# Patient Record
Sex: MALE | NOT HISPANIC OR LATINO | URBAN - NONMETROPOLITAN AREA
[De-identification: names, ages, dates, MRNs, and addresses within clinical notes are randomized per-mention and may not be internally consistent; named-entity substitution may affect disease eponyms.]

---

## 2017-06-06 ENCOUNTER — TELEPHONE (OUTPATIENT)
Dept: ENDOCRINOLOGY | Facility: CLINIC | Age: 52
End: 2017-06-06

## 2023-05-30 ENCOUNTER — HOSPITAL ENCOUNTER (EMERGENCY)
Facility: HOSPITAL | Age: 58
Discharge: HOME | End: 2023-05-30
Attending: EMERGENCY MEDICINE | Admitting: EMERGENCY MEDICINE
Payer: MEDICARE

## 2023-05-30 ENCOUNTER — APPOINTMENT (OUTPATIENT)
Dept: GENERAL RADIOLOGY | Facility: HOSPITAL | Age: 58
End: 2023-05-30
Payer: MEDICARE

## 2023-05-30 VITALS
OXYGEN SATURATION: 98 % | HEIGHT: 71 IN | TEMPERATURE: 97.7 F | WEIGHT: 138 LBS | SYSTOLIC BLOOD PRESSURE: 123 MMHG | DIASTOLIC BLOOD PRESSURE: 71 MMHG | RESPIRATION RATE: 18 BRPM | HEART RATE: 88 BPM | BODY MASS INDEX: 19.32 KG/M2

## 2023-05-30 DIAGNOSIS — R19.7 DIARRHEA, UNSPECIFIED TYPE: ICD-10-CM

## 2023-05-30 DIAGNOSIS — R07.9 CHEST PAIN, UNSPECIFIED TYPE: Primary | ICD-10-CM

## 2023-05-30 DIAGNOSIS — I31.9 PERICARDITIS, UNSPECIFIED CHRONICITY, UNSPECIFIED TYPE: ICD-10-CM

## 2023-05-30 LAB
ALBUMIN SERPL-MCNC: 4.3 G/DL (ref 3.5–5.2)
ALBUMIN/GLOB SERPL: 1.6 G/DL
ALP SERPL-CCNC: 93 U/L (ref 39–117)
ALT SERPL W P-5'-P-CCNC: 14 U/L (ref 1–41)
ANION GAP SERPL CALCULATED.3IONS-SCNC: 10 MMOL/L (ref 5–15)
AST SERPL-CCNC: 13 U/L (ref 1–40)
BASOPHILS # BLD AUTO: 0.06 10*3/MM3 (ref 0–0.2)
BASOPHILS NFR BLD AUTO: 1.2 % (ref 0–1.5)
BILIRUB SERPL-MCNC: 0.7 MG/DL (ref 0–1.2)
BUN SERPL-MCNC: 15 MG/DL (ref 6–20)
BUN/CREAT SERPL: 16.5 (ref 7–25)
CALCIUM SPEC-SCNC: 10.7 MG/DL (ref 8.6–10.5)
CHLORIDE SERPL-SCNC: 92 MMOL/L (ref 98–107)
CO2 SERPL-SCNC: 28 MMOL/L (ref 22–29)
CREAT SERPL-MCNC: 0.91 MG/DL (ref 0.76–1.27)
DEPRECATED RDW RBC AUTO: 37.8 FL (ref 37–54)
EGFRCR SERPLBLD CKD-EPI 2021: 98.9 ML/MIN/1.73
EOSINOPHIL # BLD AUTO: 0.06 10*3/MM3 (ref 0–0.4)
EOSINOPHIL NFR BLD AUTO: 1.2 % (ref 0.3–6.2)
ERYTHROCYTE [DISTWIDTH] IN BLOOD BY AUTOMATED COUNT: 11.5 % (ref 12.3–15.4)
GEN 5 2HR TROPONIN T REFLEX: 7 NG/L
GLOBULIN UR ELPH-MCNC: 2.7 GM/DL
GLUCOSE SERPL-MCNC: 205 MG/DL (ref 65–99)
HCT VFR BLD AUTO: 42.7 % (ref 37.5–51)
HGB BLD-MCNC: 14.5 G/DL (ref 13–17.7)
HOLD SPECIMEN: NORMAL
IMM GRANULOCYTES # BLD AUTO: 0.01 10*3/MM3 (ref 0–0.05)
IMM GRANULOCYTES NFR BLD AUTO: 0.2 % (ref 0–0.5)
LIPASE SERPL-CCNC: 21 U/L (ref 13–60)
LYMPHOCYTES # BLD AUTO: 1.8 10*3/MM3 (ref 0.7–3.1)
LYMPHOCYTES NFR BLD AUTO: 35.8 % (ref 19.6–45.3)
MCH RBC QN AUTO: 30.5 PG (ref 26.6–33)
MCHC RBC AUTO-ENTMCNC: 34 G/DL (ref 31.5–35.7)
MCV RBC AUTO: 89.7 FL (ref 79–97)
MONOCYTES # BLD AUTO: 0.31 10*3/MM3 (ref 0.1–0.9)
MONOCYTES NFR BLD AUTO: 6.2 % (ref 5–12)
NEUTROPHILS NFR BLD AUTO: 2.79 10*3/MM3 (ref 1.7–7)
NEUTROPHILS NFR BLD AUTO: 55.4 % (ref 42.7–76)
NRBC BLD AUTO-RTO: 0 /100 WBC (ref 0–0.2)
NT-PROBNP SERPL-MCNC: <36 PG/ML (ref 0–900)
PLATELET # BLD AUTO: 307 10*3/MM3 (ref 140–450)
PMV BLD AUTO: 9.8 FL (ref 6–12)
POTASSIUM SERPL-SCNC: 5 MMOL/L (ref 3.5–5.2)
PROT SERPL-MCNC: 7 G/DL (ref 6–8.5)
QT INTERVAL: 322 MS
QTC INTERVAL: 415 MS
RBC # BLD AUTO: 4.76 10*6/MM3 (ref 4.14–5.8)
SODIUM SERPL-SCNC: 130 MMOL/L (ref 136–145)
TROPONIN T DELTA: -1 NG/L
TROPONIN T SERPL HS-MCNC: 8 NG/L
WBC NRBC COR # BLD: 5.03 10*3/MM3 (ref 3.4–10.8)
WHOLE BLOOD HOLD COAG: NORMAL

## 2023-05-30 PROCEDURE — 99283 EMERGENCY DEPT VISIT LOW MDM: CPT

## 2023-05-30 PROCEDURE — 99284 EMERGENCY DEPT VISIT MOD MDM: CPT

## 2023-05-30 PROCEDURE — 83880 ASSAY OF NATRIURETIC PEPTIDE: CPT | Performed by: EMERGENCY MEDICINE

## 2023-05-30 PROCEDURE — 83690 ASSAY OF LIPASE: CPT

## 2023-05-30 PROCEDURE — 71045 X-RAY EXAM CHEST 1 VIEW: CPT

## 2023-05-30 PROCEDURE — 36415 COLL VENOUS BLD VENIPUNCTURE: CPT

## 2023-05-30 PROCEDURE — 85025 COMPLETE CBC W/AUTO DIFF WBC: CPT | Performed by: EMERGENCY MEDICINE

## 2023-05-30 PROCEDURE — 80053 COMPREHEN METABOLIC PANEL: CPT | Performed by: EMERGENCY MEDICINE

## 2023-05-30 PROCEDURE — 93005 ELECTROCARDIOGRAM TRACING: CPT | Performed by: EMERGENCY MEDICINE

## 2023-05-30 PROCEDURE — 84484 ASSAY OF TROPONIN QUANT: CPT | Performed by: EMERGENCY MEDICINE

## 2023-05-30 RX ORDER — NAPROXEN 500 MG/1
500 TABLET ORAL 2 TIMES DAILY WITH MEALS
Qty: 14 TABLET | Refills: 0 | Status: SHIPPED | OUTPATIENT
Start: 2023-05-30 | End: 2023-06-05

## 2023-05-30 RX ORDER — SODIUM CHLORIDE 0.9 % (FLUSH) 0.9 %
10 SYRINGE (ML) INJECTION AS NEEDED
Status: DISCONTINUED | OUTPATIENT
Start: 2023-05-30 | End: 2023-05-30 | Stop reason: HOSPADM

## 2023-05-30 NOTE — ED NOTES
"Per pt family \"we were at [employer] over the weekend and he had a slight heart attack and sent him home.\" Wants second opinion as pt is still having CP.   "

## 2023-05-30 NOTE — Clinical Note
[facility] EMERGENCY DEPARTMENT  [address] [place] [id number]  Phone: [phone]    Chandu Torres was seen and treated in our emergency department on 5/30/2023.  He may return to work on 06/01/2023.         Thank you for choosing [facility].    Vishal Anderson, DO

## 2023-05-30 NOTE — ED PROVIDER NOTES
Subjective   History of Present Illness  Pt presents to the ED with chest tightness. He reports it started last night but this morning it got worse. He was waking up from his sleep when he started to feel the tightness become greater. Non radiating. Leaning forward makes it worse. He's also been having some abdominal pain and diarrhea that started last week and his last episode of vomiting was 3 days ago. Non smoker. PMHx of DM.         Review of Systems   Constitutional: Negative for appetite change, fatigue and fever.   HENT: Negative for ear pain, sneezing, sore throat and trouble swallowing.    Respiratory: Positive for chest tightness. Negative for cough and shortness of breath.    Cardiovascular: Negative for chest pain.   Gastrointestinal: Positive for abdominal pain, diarrhea and vomiting. Negative for constipation and nausea.   Genitourinary: Negative for difficulty urinating.   Musculoskeletal: Negative for back pain and myalgias.   Neurological: Negative for headaches.       History reviewed. No pertinent past medical history.    No Known Allergies    History reviewed. No pertinent surgical history.    History reviewed. No pertinent family history.    Social History     Socioeconomic History   • Marital status:            Objective   Physical Exam  Vitals reviewed.   Constitutional:       Appearance: Normal appearance.   HENT:      Head: Normocephalic and atraumatic.   Eyes:      General: Lids are normal.      Extraocular Movements: Extraocular movements intact.      Conjunctiva/sclera: Conjunctivae normal.   Cardiovascular:      Rate and Rhythm: Normal rate and regular rhythm.      Pulses: Normal pulses.      Heart sounds: Normal heart sounds.   Pulmonary:      Effort: Pulmonary effort is normal.      Breath sounds: Normal breath sounds.   Abdominal:      General: Bowel sounds are normal.      Palpations: Abdomen is soft.   Musculoskeletal:      Right lower leg: No edema.      Left lower leg: No  edema.   Skin:     General: Skin is warm and dry.   Neurological:      General: No focal deficit present.      Mental Status: He is alert and oriented to person, place, and time.      Gait: Gait is intact.   Psychiatric:         Attention and Perception: Attention normal.         Mood and Affect: Mood normal.         Behavior: Behavior normal. Behavior is cooperative.         Thought Content: Thought content normal.         Cognition and Memory: Cognition normal.         Judgment: Judgment normal.         ECG 12 Lead ED Triage Standing Order; Chest Pain      Date/Time: 5/30/2023 10:51 AM  Performed by: Esperanza Cardenas MD  Authorized by: Vishal Andersno DO                    ED Course  ED Course as of 06/02/23 2331   Tue May 30, 2023   1258 Calcium(!): 10.7 [JV]      ED Course User Index  [JV] Vishal Anderson DO                                           Medical Decision Making  58 y.o M presents to the ED with chest tightness. Labs and imaging reviewed. Heart score 3.      Based on the positional nature of his pain, his EKG, and his report of recent diarrhea and viral illness I suspect pericarditis.    Chest pain, unspecified type: acute illness or injury  Diarrhea, unspecified type: acute illness or injury  Pericarditis, unspecified chronicity, unspecified type: acute illness or injury  Amount and/or Complexity of Data Reviewed  Labs: ordered. Decision-making details documented in ED Course.  Radiology: ordered.  ECG/medicine tests: ordered and independent interpretation performed.      Risk  OTC drugs.  Prescription drug management.          Final diagnoses:   Chest pain, unspecified type   Pericarditis, unspecified chronicity, unspecified type   Diarrhea, unspecified type       ED Disposition  ED Disposition     ED Disposition   Discharge    Condition   Stable    Comment   --             [facility] EMERGENCY DEPARTMENT  [address] [place] [address]  [id number] [phone]    As needed, If symptoms worsen at any time    Nahid Carias MD  [address] [place] [address] [address] [place] [address] [phone]    Call today  To make an appointment for reevaluation of chest pain after an emergency department visit and be further evaluated for your coronary arteries and the possibility of pericarditis         Medication List      New Prescriptions    naproxen 500 MG EC tablet  Commonly known as: EC NAPROSYN  Take 1 tablet by mouth 2 (Two) Times a Day With Meals.           Where to Get Your Medications      These medications were sent to [id number] - [place], [place] - [address] AT [address] - [phone]  - [phone]   [address] [place] [address]    Phone: [phone]   · naproxen 500 MG EC tablet          Vishal Anderson DO  06/02/23 9762

## 2023-06-05 ENCOUNTER — LAB (OUTPATIENT)
Dept: LAB | Facility: HOSPITAL | Age: 58
End: 2023-06-05
Payer: MEDICARE

## 2023-06-05 ENCOUNTER — OFFICE VISIT (OUTPATIENT)
Dept: FAMILY MEDICINE CLINIC | Facility: CLINIC | Age: 58
End: 2023-06-05
Payer: MEDICARE

## 2023-06-05 VITALS
OXYGEN SATURATION: 99 % | DIASTOLIC BLOOD PRESSURE: 90 MMHG | HEART RATE: 115 BPM | BODY MASS INDEX: 17.25 KG/M2 | SYSTOLIC BLOOD PRESSURE: 132 MMHG | HEIGHT: 71 IN | WEIGHT: 123.2 LBS | TEMPERATURE: 98.4 F

## 2023-06-05 DIAGNOSIS — E11.43 TYPE 2 DIABETES MELLITUS WITH DIABETIC AUTONOMIC NEUROPATHY, WITHOUT LONG-TERM CURRENT USE OF INSULIN: ICD-10-CM

## 2023-06-05 DIAGNOSIS — E11.9 TYPE 2 DIABETES MELLITUS WITHOUT COMPLICATION, WITHOUT LONG-TERM CURRENT USE OF INSULIN: ICD-10-CM

## 2023-06-05 DIAGNOSIS — Z00.00 ENCOUNTER FOR MEDICAL EXAMINATION TO ESTABLISH CARE: ICD-10-CM

## 2023-06-05 DIAGNOSIS — Z51.81 THERAPEUTIC DRUG MONITORING: ICD-10-CM

## 2023-06-05 DIAGNOSIS — E11.9 TYPE 2 DIABETES MELLITUS WITHOUT COMPLICATION, WITHOUT LONG-TERM CURRENT USE OF INSULIN: Primary | ICD-10-CM

## 2023-06-05 LAB
AMPHET+METHAMPHET UR QL: NEGATIVE
AMPHETAMINES UR QL: NEGATIVE
BARBITURATES UR QL SCN: NEGATIVE
BENZODIAZ UR QL SCN: NEGATIVE
BUPRENORPHINE SERPL-MCNC: NEGATIVE NG/ML
CANNABINOIDS SERPL QL: NEGATIVE
COCAINE UR QL: POSITIVE
FENTANYL UR-MCNC: NEGATIVE NG/ML
METHADONE UR QL SCN: NEGATIVE
OPIATES UR QL: NEGATIVE
OXYCODONE UR QL SCN: NEGATIVE
PCP UR QL SCN: NEGATIVE
PROPOXYPH UR QL: NEGATIVE
TRICYCLICS UR QL SCN: NEGATIVE

## 2023-06-05 PROCEDURE — 80061 LIPID PANEL: CPT

## 2023-06-05 PROCEDURE — 36415 COLL VENOUS BLD VENIPUNCTURE: CPT

## 2023-06-05 PROCEDURE — 80307 DRUG TEST PRSMV CHEM ANLYZR: CPT

## 2023-06-05 PROCEDURE — 83036 HEMOGLOBIN GLYCOSYLATED A1C: CPT

## 2023-06-05 RX ORDER — GABAPENTIN 300 MG/1
300 CAPSULE ORAL NIGHTLY
Qty: 30 CAPSULE | Refills: 0 | Status: SHIPPED | OUTPATIENT
Start: 2023-06-05

## 2023-06-05 RX ORDER — DULAGLUTIDE 1.5 MG/.5ML
INJECTION, SOLUTION SUBCUTANEOUS
COMMUNITY
Start: 2023-05-08

## 2023-06-05 NOTE — PROGRESS NOTES
Establish care   HTN  DM  Neuropathy takes lisinopril 5mg  Had potatoe chips before he came ere   On metformin 1000 BID+  Trulicity once a week   Naproxen 500mg     Neuropathy started 7 years   Blt                                                                                            Affects day to day life  Gabapentin stopped nov 2021    Check BP at home 117/89  BS at home  169        [facility]  Esperanza Cardenas MD    Subjective:     Chandu Torres is a 58 y.o. male who presents to the clinic to establish care. Pt has a hx of HTN and DM and does not feel as if it is being managing well. He also has BLT neuropathy in his feet. He does check his BP at home and it usually runs 117/89 and his blood sugars run around 169.     The following portions of the patient's history were reviewed and updated as appropriate: allergies, current medications, past family history, past medical history, past social history, past surgical history and problem list.    Past Medical Hx:  History reviewed. No pertinent past medical history.    Past Surgical Hx:  History reviewed. No pertinent surgical history.    Current Meds:    Current Outpatient Medications:   •  Trulicity 1.5 MG/0.5ML solution pen-injector, ADMINISTER 1.5 MG UNDER THE SKIN WEEKLY, Disp: , Rfl:   •  Dulaglutide (Trulicity) 3 MG/0.5ML solution pen-injector, Inject 0.5 mL under the skin into the appropriate area as directed 1 (One) Time Per Week., Disp: 0.5 mL, Rfl: 0  •  empagliflozin (Jardiance) 10 MG tablet tablet, Take 1 tablet by mouth Daily., Disp: 30 tablet, Rfl: 0  •  gabapentin (NEURONTIN) 300 MG capsule, Take 1 capsule by mouth Every Night., Disp: 30 capsule, Rfl: 0  •  metFORMIN (FORTAMET) 1000 MG (OSM) 24 hr tablet, Take 1 tablet by mouth 2 (Two) Times a Day With Meals., Disp: 60 tablet, Rfl: 0    Allergies:  No Known Allergies    Family Hx:  History reviewed. No pertinent family history.     Social History:  Social History     Socioeconomic  "History   • Marital status:        Review of Systems  Review of Systems   Constitutional: Negative for appetite change, fatigue and fever.   HENT: Negative for ear pain, sneezing, sore throat and trouble swallowing.    Respiratory: Negative for cough, chest tightness and shortness of breath.    Cardiovascular: Negative for chest pain.   Gastrointestinal: Negative for abdominal pain, constipation, diarrhea, nausea and vomiting.   Genitourinary: Negative for difficulty urinating.   Musculoskeletal: Negative for back pain and myalgias.   Neurological: Negative for headaches.       Objective:     /90   Pulse 115   Temp 98.4 °F (36.9 °C)   Ht 180.3 cm (71\")   Wt 55.9 kg (123 lb 3.2 oz)   SpO2 99%   BMI 17.18 kg/m²   Physical Exam  Vitals reviewed.   Constitutional:       Appearance: Normal appearance.   HENT:      Head: Normocephalic and atraumatic.   Eyes:      General: Lids are normal.      Extraocular Movements: Extraocular movements intact.      Conjunctiva/sclera: Conjunctivae normal.   Cardiovascular:      Rate and Rhythm: Normal rate and regular rhythm.      Pulses: Normal pulses.      Heart sounds: Normal heart sounds.   Pulmonary:      Effort: Pulmonary effort is normal.      Breath sounds: Normal breath sounds.   Abdominal:      General: Bowel sounds are normal.      Palpations: Abdomen is soft.   Musculoskeletal:      Right lower leg: No edema.      Left lower leg: No edema.   Skin:     General: Skin is warm and dry.   Neurological:      General: No focal deficit present.      Mental Status: He is alert and oriented to person, place, and time.      Gait: Gait is intact.   Psychiatric:         Attention and Perception: Attention normal.         Mood and Affect: Mood normal.         Behavior: Behavior normal. Behavior is cooperative.         Thought Content: Thought content normal.         Cognition and Memory: Cognition normal.         Judgment: Judgment normal.          Assessment/Plan: "     Diagnoses and all orders for this visit:    1. Type 2 diabetes mellitus without complication, without long-term current use of insulin (Primary)  -     Hemoglobin A1c; Future  -     Lipid panel; Future    2. Encounter for medical examination to establish care  -     Lipid panel; Future    3. Therapeutic drug monitoring  -     Urine Drug Screen - Urine, Clean Catch; Future    4. Type 2 diabetes mellitus with diabetic autonomic neuropathy, without long-term current use of insulin  -     gabapentin (NEURONTIN) 300 MG capsule; Take 1 capsule by mouth Every Night.  Dispense: 30 capsule; Refill: 0      T2DM- check A1c and lipid panel. Make adjustments to medication as needed. Pt is currently on Trulicity. Will titrate up to 3.0. titrate as needed.    Neuropathy- Pt was taking gabapentin. Stopped taking it two years ago. Will like to restart it. Pt signed pain contract and understand the risk and benefits of this medication. UDS pending.     · Rx changes: see a/p  · Patient Education: see a/p  · Compliance at present is estimated to be good.   · Efforts to improve compliance (if necessary) will be directed at increased exercise.    Depression screening: Up to date; last screen 6/5/2023     Follow-up:     Return in about 4 weeks (around 7/3/2023).    Preventative:  Health Maintenance   Topic Date Due   • Hepatitis B (1 of 3 - 3-dose series) Never done   • URINE MICROALBUMIN  Never done   • COLORECTAL CANCER SCREENING  Never done   • COVID-19 Vaccine (1) Never done   • Pneumococcal Vaccine 0-64 (1 - PCV) Never done   • ZOSTER VACCINE (1 of 2) Never done   • HEPATITIS C SCREENING  Never done   • ANNUAL WELLNESS VISIT  Never done   • DIABETIC FOOT EXAM  Never done   • DIABETIC EYE EXAM  Never done   • INFLUENZA VACCINE  08/01/2023   • HEMOGLOBIN A1C  12/05/2023   • TDAP/TD VACCINES (2 - Td or Tdap) 12/31/2023       Alcohol use:  has no history on file for alcohol use.  Nicotine status  has no history on file for tobacco  use.     Goals    None         RISK SCORE: 3            This document has been electronically signed by Esperanza Cardenas MD on June 9, 2023 14:24 CDT                                                                          A1C  LipidUDS

## 2023-06-06 LAB
CHOLEST SERPL-MCNC: 178 MG/DL (ref 0–200)
HBA1C MFR BLD: 12.5 % (ref 4.8–5.6)
HDLC SERPL-MCNC: 93 MG/DL (ref 40–60)
LDLC SERPL CALC-MCNC: 65 MG/DL (ref 0–100)
LDLC/HDLC SERPL: 0.66 {RATIO}
TRIGL SERPL-MCNC: 118 MG/DL (ref 0–150)
VLDLC SERPL-MCNC: 20 MG/DL (ref 5–40)

## 2023-06-07 ENCOUNTER — TELEPHONE (OUTPATIENT)
Dept: FAMILY MEDICINE CLINIC | Facility: CLINIC | Age: 58
End: 2023-06-07
Payer: MEDICARE

## 2023-06-07 DIAGNOSIS — E11.9 TYPE 2 DIABETES MELLITUS WITHOUT COMPLICATION, WITHOUT LONG-TERM CURRENT USE OF INSULIN: Primary | ICD-10-CM

## 2023-06-07 RX ORDER — METFORMIN HYDROCHLORIDE EXTENDED-RELEASE TABLETS 1000 MG/1
1000 TABLET, FILM COATED, EXTENDED RELEASE ORAL 2 TIMES DAILY WITH MEALS
Qty: 60 TABLET | Refills: 0 | Status: SHIPPED | OUTPATIENT
Start: 2023-06-07

## 2023-06-07 RX ORDER — DULAGLUTIDE 3 MG/.5ML
3 INJECTION, SOLUTION SUBCUTANEOUS WEEKLY
Qty: 0.5 ML | Refills: 0 | Status: SHIPPED | OUTPATIENT
Start: 2023-06-07

## 2023-06-07 NOTE — TELEPHONE ENCOUNTER
Spoke to patient about UDS pos for cocaine. He has signed a pain contract during his initial visit to get treated for neuropathy. He has been prescribed gabapentin. Pt agrees verbally agrees this will not happen again.     For his DM- maxed out his metformin to 1000 BID, started jardiance 10mg, and increased trulicity to 3.0 mg weekly.             This document has been electronically signed by Esperanza Cardenas MD on June 7, 2023 16:14 CDT

## 2023-06-09 NOTE — PROGRESS NOTES
Chandu Torres  6/5/23  Subjective:     Chandu Torres is a 58 y.o. male who presents for   Chief Complaint   Patient presents with   • Establish Care       58-year-old male with history of type 2 diabetes hypertension chronic peripheral neuropathy recently seen at the emergency room with chest pain here today to establish.  Patient has neuropathic pain in both lower extremities and attributes this to his diabetes he relates his sugars have been in the mid to upper 100s and blood pressures at home range in the 120-140/80-90 range.  Patient was recently seen with chest pain but currently denies chest pain he continues on lisinopril 5 mg daily metformin 1000 twice daily and Trulicity.  He notes neuropathic symptoms ongoing for 7 to 8 years and had previously been on gabapentin and is requesting a refill on this.  Please see Dr. Cardenas's note for more detailed information regarding today's encounter physical exam findings and plan of care.        Past Medical Hx:  History reviewed. No pertinent past medical history.    Past Surgical Hx:  History reviewed. No pertinent surgical history.    Health Maintenance:  Health Maintenance   Topic Date Due   • Hepatitis B (1 of 3 - 3-dose series) Never done   • URINE MICROALBUMIN  Never done   • COLORECTAL CANCER SCREENING  Never done   • COVID-19 Vaccine (1) Never done   • Pneumococcal Vaccine 0-64 (1 - PCV) Never done   • ZOSTER VACCINE (1 of 2) Never done   • HEPATITIS C SCREENING  Never done   • ANNUAL WELLNESS VISIT  Never done   • DIABETIC FOOT EXAM  Never done   • DIABETIC EYE EXAM  Never done   • INFLUENZA VACCINE  08/01/2023   • HEMOGLOBIN A1C  12/05/2023   • TDAP/TD VACCINES (2 - Td or Tdap) 12/31/2023       Current Meds:    Current Outpatient Medications:   •  Trulicity 1.5 MG/0.5ML solution pen-injector, ADMINISTER 1.5 MG UNDER THE SKIN WEEKLY, Disp: , Rfl:   •  Dulaglutide (Trulicity) 3 MG/0.5ML solution pen-injector, Inject 0.5 mL under the skin into the  "appropriate area as directed 1 (One) Time Per Week., Disp: 0.5 mL, Rfl: 0  •  empagliflozin (Jardiance) 10 MG tablet tablet, Take 1 tablet by mouth Daily., Disp: 30 tablet, Rfl: 0  •  gabapentin (NEURONTIN) 300 MG capsule, Take 1 capsule by mouth Every Night., Disp: 30 capsule, Rfl: 0  •  metFORMIN (FORTAMET) 1000 MG (OSM) 24 hr tablet, Take 1 tablet by mouth 2 (Two) Times a Day With Meals., Disp: 60 tablet, Rfl: 0    Allergies:  Patient has no known allergies.    Family Hx:  History reviewed. No pertinent family history.     Social History:  Social History     Socioeconomic History   • Marital status:        Review of Systems  Review of Systems    Objective:     /90   Pulse 115   Temp 98.4 °F (36.9 °C)   Ht 180.3 cm (71\")   Wt 55.9 kg (123 lb 3.2 oz)   SpO2 99%   BMI 17.18 kg/m²   Physical Exam alert no distress please see Dr. Cardenas's note for more detailed information regarding physical exam findings    Lab Review  Results for orders placed or performed during the hospital encounter of 05/30/23   High Sensitivity Troponin T    Specimen: Blood   Result Value Ref Range    HS Troponin T 8 <15 ng/L   Comprehensive Metabolic Panel    Specimen: Blood   Result Value Ref Range    Glucose 205 (H) 65 - 99 mg/dL    BUN 15 6 - 20 mg/dL    Creatinine 0.91 0.76 - 1.27 mg/dL    Sodium 130 (L) 136 - 145 mmol/L    Potassium 5.0 3.5 - 5.2 mmol/L    Chloride 92 (L) 98 - 107 mmol/L    CO2 28.0 22.0 - 29.0 mmol/L    Calcium 10.7 (H) 8.6 - 10.5 mg/dL    Total Protein 7.0 6.0 - 8.5 g/dL    Albumin 4.3 3.5 - 5.2 g/dL    ALT (SGPT) 14 1 - 41 U/L    AST (SGOT) 13 1 - 40 U/L    Alkaline Phosphatase 93 39 - 117 U/L    Total Bilirubin 0.7 0.0 - 1.2 mg/dL    Globulin 2.7 gm/dL    A/G Ratio 1.6 g/dL    BUN/Creatinine Ratio 16.5 7.0 - 25.0    Anion Gap 10.0 5.0 - 15.0 mmol/L    eGFR 98.9 >60.0 mL/min/1.73   BNP    Specimen: Blood   Result Value Ref Range    proBNP <36.0 0.0 - 900.0 pg/mL   CBC Auto Differential    Specimen: " Blood   Result Value Ref Range    WBC 5.03 3.40 - 10.80 10*3/mm3    RBC 4.76 4.14 - 5.80 10*6/mm3    Hemoglobin 14.5 13.0 - 17.7 g/dL    Hematocrit 42.7 37.5 - 51.0 %    MCV 89.7 79.0 - 97.0 fL    MCH 30.5 26.6 - 33.0 pg    MCHC 34.0 31.5 - 35.7 g/dL    RDW 11.5 (L) 12.3 - 15.4 %    RDW-SD 37.8 37.0 - 54.0 fl    MPV 9.8 6.0 - 12.0 fL    Platelets 307 140 - 450 10*3/mm3    Neutrophil % 55.4 42.7 - 76.0 %    Lymphocyte % 35.8 19.6 - 45.3 %    Monocyte % 6.2 5.0 - 12.0 %    Eosinophil % 1.2 0.3 - 6.2 %    Basophil % 1.2 0.0 - 1.5 %    Immature Grans % 0.2 0.0 - 0.5 %    Neutrophils, Absolute 2.79 1.70 - 7.00 10*3/mm3    Lymphocytes, Absolute 1.80 0.70 - 3.10 10*3/mm3    Monocytes, Absolute 0.31 0.10 - 0.90 10*3/mm3    Eosinophils, Absolute 0.06 0.00 - 0.40 10*3/mm3    Basophils, Absolute 0.06 0.00 - 0.20 10*3/mm3    Immature Grans, Absolute 0.01 0.00 - 0.05 10*3/mm3    nRBC 0.0 0.0 - 0.2 /100 WBC   Lipase    Specimen: Blood   Result Value Ref Range    Lipase 21 13 - 60 U/L   High Sensitivity Troponin T 2Hr    Specimen: Blood   Result Value Ref Range    HS Troponin T 7 <15 ng/L    Troponin T Delta -1 >=-4 - <+4 ng/L   ECG 12 Lead ED Triage Standing Order; Chest Pain   Result Value Ref Range    QT Interval 322 ms    QTC Interval 415 ms   Gold Top - SST   Result Value Ref Range    Extra Tube Hold for add-ons.    Light Blue Top   Result Value Ref Range    Extra Tube Hold for add-ons.           Assessment:     Diagnoses and all orders for this visit:    1. Type 2 diabetes mellitus without complication, without long-term current use of insulin (Primary)  -     Hemoglobin A1c; Future  -     Lipid panel; Future    2. Encounter for medical examination to establish care  -     Lipid panel; Future    3. Therapeutic drug monitoring  -     Urine Drug Screen - Urine, Clean Catch; Future    4. Type 2 diabetes mellitus with diabetic autonomic neuropathy, without long-term current use of insulin  -     gabapentin (NEURONTIN) 300 MG  capsule; Take 1 capsule by mouth Every Night.  Dispense: 30 capsule; Refill: 0        Plan:     I have seen and examined the patient.  I have reviewed the notes, assessments, and/or procedures performed by Dr. Cardenas during the office visit.  I concur with her/his documentation and assessment and plan for Chanduyoni Torres.        This document has been electronically signed by Aron Sung MD on June 9, 2023 17:00 CDT

## 2023-07-05 LAB
QT INTERVAL: 322 MS
QTC INTERVAL: 415 MS